# Patient Record
Sex: MALE | Race: WHITE | NOT HISPANIC OR LATINO | Employment: FULL TIME | URBAN - METROPOLITAN AREA
[De-identification: names, ages, dates, MRNs, and addresses within clinical notes are randomized per-mention and may not be internally consistent; named-entity substitution may affect disease eponyms.]

---

## 2017-05-10 ENCOUNTER — HOSPITAL ENCOUNTER (EMERGENCY)
Facility: HOSPITAL | Age: 52
Discharge: HOME | End: 2017-05-10
Attending: EMERGENCY MEDICINE
Payer: COMMERCIAL

## 2017-05-10 VITALS
TEMPERATURE: 99 F | SYSTOLIC BLOOD PRESSURE: 139 MMHG | BODY MASS INDEX: 31.02 KG/M2 | DIASTOLIC BLOOD PRESSURE: 72 MMHG | RESPIRATION RATE: 21 BRPM | OXYGEN SATURATION: 100 % | HEART RATE: 80 BPM | WEIGHT: 229 LBS | HEIGHT: 72 IN

## 2017-05-10 DIAGNOSIS — R56.9 PARTIAL SEIZURE: Primary | ICD-10-CM

## 2017-05-10 DIAGNOSIS — R53.1 WEAKNESS: ICD-10-CM

## 2017-05-10 LAB
ALBUMIN SERPL BCP-MCNC: 4.1 G/DL
ALP SERPL-CCNC: 72 U/L
ALT SERPL W/O P-5'-P-CCNC: 53 U/L
ANION GAP SERPL CALC-SCNC: 8 MMOL/L
AST SERPL-CCNC: 33 U/L
BASOPHILS # BLD AUTO: 0.02 K/UL
BASOPHILS NFR BLD: 0.3 %
BILIRUB SERPL-MCNC: 0.5 MG/DL
BUN SERPL-MCNC: 21 MG/DL
CALCIUM SERPL-MCNC: 9.4 MG/DL
CHLORIDE SERPL-SCNC: 105 MMOL/L
CHOLEST/HDLC SERPL: 7.7 {RATIO}
CK SERPL-CCNC: 152 U/L
CO2 SERPL-SCNC: 27 MMOL/L
CREAT SERPL-MCNC: 1.1 MG/DL
DIFFERENTIAL METHOD: ABNORMAL
EOSINOPHIL # BLD AUTO: 0.2 K/UL
EOSINOPHIL NFR BLD: 3.2 %
ERYTHROCYTE [DISTWIDTH] IN BLOOD BY AUTOMATED COUNT: 12.8 %
EST. GFR  (AFRICAN AMERICAN): >60 ML/MIN/1.73 M^2
EST. GFR  (NON AFRICAN AMERICAN): >60 ML/MIN/1.73 M^2
GLUCOSE SERPL-MCNC: 101 MG/DL
HCT VFR BLD AUTO: 42.9 %
HDL/CHOLESTEROL RATIO: 13.1 %
HDLC SERPL-MCNC: 222 MG/DL
HDLC SERPL-MCNC: 29 MG/DL
HGB BLD-MCNC: 14.8 G/DL
INR PPP: 0.9
LDLC SERPL CALC-MCNC: ABNORMAL MG/DL
LYMPHOCYTES # BLD AUTO: 3.1 K/UL
LYMPHOCYTES NFR BLD: 41.7 %
MCH RBC QN AUTO: 30.4 PG
MCHC RBC AUTO-ENTMCNC: 34.5 %
MCV RBC AUTO: 88 FL
MONOCYTES # BLD AUTO: 0.5 K/UL
MONOCYTES NFR BLD: 7.3 %
NEUTROPHILS # BLD AUTO: 3.5 K/UL
NEUTROPHILS NFR BLD: 47.1 %
NONHDLC SERPL-MCNC: 193 MG/DL
PLATELET # BLD AUTO: 205 K/UL
PMV BLD AUTO: 8.7 FL
POCT GLUCOSE: 115 MG/DL (ref 70–110)
POTASSIUM SERPL-SCNC: 4.2 MMOL/L
PROT SERPL-MCNC: 7.5 G/DL
PROTHROMBIN TIME: 10 SEC
RBC # BLD AUTO: 4.87 M/UL
SODIUM SERPL-SCNC: 140 MMOL/L
TRIGL SERPL-MCNC: 629 MG/DL
TROPONIN I SERPL DL<=0.01 NG/ML-MCNC: 0.01 NG/ML
TSH SERPL DL<=0.005 MIU/L-ACNC: 1.12 UIU/ML
WBC # BLD AUTO: 7.39 K/UL

## 2017-05-10 PROCEDURE — 96375 TX/PRO/DX INJ NEW DRUG ADDON: CPT

## 2017-05-10 PROCEDURE — A9585 GADOBUTROL INJECTION: HCPCS | Performed by: EMERGENCY MEDICINE

## 2017-05-10 PROCEDURE — 85610 PROTHROMBIN TIME: CPT

## 2017-05-10 PROCEDURE — 80061 LIPID PANEL: CPT

## 2017-05-10 PROCEDURE — 99243 OFF/OP CNSLTJ NEW/EST LOW 30: CPT | Mod: GT,,, | Performed by: PSYCHIATRY & NEUROLOGY

## 2017-05-10 PROCEDURE — 84443 ASSAY THYROID STIM HORMONE: CPT

## 2017-05-10 PROCEDURE — 80053 COMPREHEN METABOLIC PANEL: CPT

## 2017-05-10 PROCEDURE — 63600175 PHARM REV CODE 636 W HCPCS: Performed by: EMERGENCY MEDICINE

## 2017-05-10 PROCEDURE — 96374 THER/PROPH/DIAG INJ IV PUSH: CPT

## 2017-05-10 PROCEDURE — 84484 ASSAY OF TROPONIN QUANT: CPT

## 2017-05-10 PROCEDURE — 25000003 PHARM REV CODE 250: Performed by: EMERGENCY MEDICINE

## 2017-05-10 PROCEDURE — 93005 ELECTROCARDIOGRAM TRACING: CPT

## 2017-05-10 PROCEDURE — 25500020 PHARM REV CODE 255: Performed by: EMERGENCY MEDICINE

## 2017-05-10 PROCEDURE — 82962 GLUCOSE BLOOD TEST: CPT

## 2017-05-10 PROCEDURE — 82550 ASSAY OF CK (CPK): CPT

## 2017-05-10 PROCEDURE — 85025 COMPLETE CBC W/AUTO DIFF WBC: CPT

## 2017-05-10 PROCEDURE — 99285 EMERGENCY DEPT VISIT HI MDM: CPT | Mod: 25

## 2017-05-10 RX ORDER — LORAZEPAM 2 MG/ML
1 INJECTION INTRAMUSCULAR
Status: COMPLETED | OUTPATIENT
Start: 2017-05-10 | End: 2017-05-10

## 2017-05-10 RX ORDER — LEVETIRACETAM 1000 MG/1
1000 TABLET ORAL 2 TIMES DAILY
Qty: 60 TABLET | Refills: 10 | Status: SHIPPED | OUTPATIENT
Start: 2017-05-10 | End: 2018-05-10

## 2017-05-10 RX ORDER — LISINOPRIL 20 MG/1
20 TABLET ORAL DAILY
COMMUNITY

## 2017-05-10 RX ORDER — GADOBUTROL 604.72 MG/ML
10 INJECTION INTRAVENOUS
Status: COMPLETED | OUTPATIENT
Start: 2017-05-10 | End: 2017-05-10

## 2017-05-10 RX ORDER — NAPROXEN SODIUM 220 MG/1
81 TABLET, FILM COATED ORAL DAILY
COMMUNITY

## 2017-05-10 RX ADMIN — GADOBUTROL 10 ML: 604.72 INJECTION INTRAVENOUS at 01:05

## 2017-05-10 RX ADMIN — LORAZEPAM 1 MG: 2 INJECTION, SOLUTION INTRAMUSCULAR; INTRAVENOUS at 11:05

## 2017-05-10 RX ADMIN — DEXTROSE 3000 MG: 50 INJECTION, SOLUTION INTRAVENOUS at 02:05

## 2017-05-10 RX ADMIN — SODIUM CHLORIDE 1000 ML: 0.9 INJECTION, SOLUTION INTRAVENOUS at 10:05

## 2017-05-10 NOTE — DISCHARGE INSTRUCTIONS
"  Seizure: New Onset, Unknown Cause (Adult)  You have had a seizure today. A seizure happens when a burst of random, uncontrolled electrical activity occurs in the brain. A seizure can have many causes. Often its not possible to figure out the exact cause of a seizure from a single exam. You might need other tests. Having a single seizure doesnt mean that you will continue to have seizures or that you have epilepsy. But until doctors know the cause of your seizure, you should assume that another seizure is possible.  Home care  Follow these tips when caring for yourself at home. For this seizure:  · Seizures arent predictable. So avoid doing anything that might cause danger to you or other people if you have another seizure. Dont drive, ride a bike, or operate dangerous equipment.  · Dont take a bath alone. Take a shower instead.  · Dont swim alone until your healthcare provider says that you are no longer in danger of having another seizure.  · Tell your close friends and relatives about your seizure. Teach them what to do for you if it happens again.  · If medicine was prescribed to prevent seizures, take it exactly as directed. It does not work when taken "as needed." Missing doses will increase the risk of having another seizure.  · Follow a regular sleep schedule such that you get at least 6 to 8 hours of restful sleep every night. This is especially important when you are sick and have a cold, flu, or another type of infection.  · Don't drink alcoholic beverages until your doctor says it's OK. Do not ever use recreational drugs.  For future seizures, if you are alone:  If you feel a seizure coming on, lie down on a bed or on the floor with something soft under your head. Lie on your left side, not on your back. This will keep you from falling. It will also let fluid drain out of your mouth and prevent choking. Be sure you are clear of any objects that might injure you during the seizure. Call 911 if " there is time.  For future seizures, if someone is with you:  The person should help you get into a safe position and call 911. The person shouldnt try to force anything in your mouth once the seizure begins. This could harm your teeth or jaw.  After a seizure, you may be drowsy or confused. The person should stay with you until you are fully awake. The person shouldnt offer you anything to eat or drink during that time. Call 911 or go to the emergency department so that you can be looked at.  Follow-up care  Follow up with your healthcare provider, or as advised. You may need other tests to help figure out what caused your seizure. These tests may include brain wave tests (EEG) or brain scans (MRI or CT scans). Keep a seizure calendar to record how often you have a seizure. If you are being started on anti-seizure medicine, make sure that you use additional birth control protection. Seizure medicine can affect how well birth control pills work, and you could become pregnant. Don't drink alcohol until your doctor tells you its OK. Do not ever use recreational drugs.  Note: For the safety of yourself and others on the road, certain states require that the treating doctor tell the Public Health Department about any adult who is treated for a seizure and is at risk of more seizures. In this case, the Department of Motor Vehicles will be told. A restriction will be put on your s license until a doctor gives you medical clearance to drive again. Contact your treating doctor to find out if your state requires the reporting of patients with a seizures condition.  When to seek medical advice  Call your healthcare provider right away if any of these occur:  · Another seizure  · Fever over 100.4ºF (38.0ºC), or as advised  · Unusual irritability, drowsiness, or confusion  · Headache or neck pain that gets worse  Date Last Reviewed: 8/1/2016  © 1566-9853 [employer]. [address] [place] [address], [place]  [address]. All rights reserved. This information is not intended as a substitute for professional medical care. Always follow your healthcare professional's instructions.

## 2017-05-10 NOTE — CONSULTS
"   [facility]  Tele-Consult    Consultation started: 5/10/2017 at 9:14AM   Consulting Provider:  Dr. Abel  The patient location is [facility]  Spoke hospital nurse at bedside with patient assisting consultant.     Subjective:   Subjective   History of Present Illness:  Jamal Malagon is a 52 y.o. male who presents with acute onset of lightheadedness associated with intense thirst, right sided paresthesias, generalized weakness and near syncope. Per co-worker, patient was slow to answer questions and seemed confused about what he was doing prior to onset of symptoms. He was able to walk to a private vehicle with help. He had no focal weakness per witness but the tremor in the right arm was persistent..Onset ~0830. Seemed tp improve after drinking a glass of "cold water". Had similar event 2 years ago and had stroke work-up at that time with MRI and MRA which was negative. Discharged with diagnosis of dehydration.    Wake up Stroke?: no  Last known normal:    Recent bleeding noted: no  Does the patient take any Blood Thinners? no           H&P was obtained from patient and co-worker and ED physician..  Past Medical History: hypertension    Medications: No current facility-administered medications for this encounter.     Current Outpatient Prescriptions:     aspirin 81 MG Chew, Take 81 mg by mouth once daily., Disp: , Rfl:     lisinopril (PRINIVIL,ZESTRIL) 20 MG tablet, Take 20 mg by mouth once daily., Disp: , Rfl:     Allergies: Review of patient's allergies indicates:  No Known Allergies    Objective:     Vitals:   Vitals:    05/10/17 0905   BP: (!) 178/103   Pulse: 80   Resp: 18   Temp: 98.7 °F (37.1 °C)        Objective   Physical Exam:  General: well developed, well nourished   HENT: Head:normocephalic and atraumatic   HENT: Ears: right ear normal and left ear normal  Nose: normal nares and no discharge  Eyes:conjunctivae/corneas clear. PERRL.  Neck: normal, no " obvious masses and trachea to midline  Lungs: normal respiratory effort  Cardiovascular: Heart: regular rate and rhythm   Cardiovascular: Extremities: no cyanosis, no edema and no clubbing  Abdomen: appears normal and not distended  Skin:  skin color and texture normal, no rash and no bruises  Musculoskeletal: normal range of motion in all extremities  Psych/Behavioral: appropriate affect       Imaging Notes: No hemmorhage. No mass effect. No early infarct signs. Impression performed at: 0925    NIH Stroke Scale:  Interval: baseline (upon arrival/admit)  Level of Consciousness: 0 - alert  LOC Questions: 0 - answers both correctly  LOC Commands: 0 - performs both correctly  Best Gaze: 0 - normal  Visual: 0 - no visual loss  Facial Palsy: 0 - normal  Motor Left Arm: 0 - no drift  Motor Right Arm: 0 - no drift  Motor Left Le - no drift  Motor Right Le - no drift  Limb Ataxia: 0 - absent  Sensory: 0 - normal  Best Language: 0 - no aphasia  Dysarthria: 0 - normal articulation  Extinction and Inattention: 0 - no neglect  NIH Stroke Scale Total: 0  Ramakrishna Coma Scale:  Best Eye Response: 4 - spontaneous  Best Motor Response: 6 - obeys commands  Best Verbal Response: 5 - oriented  Ramakrishna Coma Scale Total: 15  Modified Raysal Scale:   Timeline: Prior to symptoms onset  Modified Anne Score: 0 - no symptoms    ABCD2 Scale for TIA:   Age > or = 60: 0 - no  B/P or = 140/9 at Initial Evaluation: 1 - yes  Clinical Features of TIA: 0 - other symptoms  Duration of Symptoms: 2 - > or equal to 60 minutes  Diabetes Mellitus in History: 0 - no  ABCD2 Scale Total: 3  [id number] Scale:   Age: 0 - < 65 years old  CHF History: 0 - no  HTN History: 1 - yes  Stroke/TIA/Thromboembolism History: 0 - no  Vascular Disease History: 0 - no  Diabetes Mellitus in History: 0 - no  Female: 0 - no  [id number] Scale Total: 1        Assessment - Diagnosis - Goals:     Plan     Diagnosis/Impression: Dehydration vs vasovagal event. Consider  seizure given RUE tremors.    Altaplase Recommendation: Altaplase not recommended due to No evidence of stroke    Recommendation: NPO until after pass dysphagia screen. Diagnostic studies: Lipid Profile to assess cholesterol levels, MRI head without contrast to assess brain parenchyma, Trans-thoracic cardiac echo to assess cardiac function/status  Antithrombotics: Aspirin: 325 mg oral daily  Statins: Atorvastatin- 40 mg oral daily  B/P Parameters; -160, DBP 70-80.  Hydration and EEG    Disposition Recommendation:  admit to inpatient  do not transfer       Possible Interventional Revascularization Candidate? No; No significant neurological deficit    Recommended the emergency room physician to have a brief discussion with the patient and/or family if available regarding the risks and benefits of treatment, and to briefly document the occurrence of that discussion in his clinical encounter note.     The attending portion of this evaluation, treatment, and documentation was performed per Samra Huynh via audiovisual.      Billing code:  (non-stroke, some mimics)    · This patient has neurological symptom(s)/condition/illness, with minimal potential for morbidity and mortality.  · There is a low probability for acute neurological change leading to clinical and possibly life-threatening deterioration requiring highest level of physician preparedness for urgent intervention.  · Care was coordinated with other physicians involved in the patient's care.  · Radiologic studies and laboratory data were reviewed and interpreted, and plan of care was re-assessed based on the results.  Diagnosis, treatment options and prognosis may have been discussed with the patient and/or family members or caregiver.      Consultation ended: 5/10/2017 at 0935

## 2017-05-10 NOTE — ED NOTES
Spoke with radha in pharmacy about the keppra dose and was told it was ok at 3,000mg. Medication dose verified with Dr. Benton

## 2017-05-10 NOTE — CONSULTS
"NEUROLOGY FLOOR CONSULT    Reason for consult:  Jeromy    Informant:  Patient       Other sources of information : spouse/SO        HPI:   Jamal Malagon is a 52 y.o. male who presents with acute onset of lightheadedness associated with intense thirst, right sided paresthesias, generalized weakness and near syncope. Per co-worker, patient was slow to answer questions and seemed confused about what he was doing prior to onset of symptoms. He was able to walk to a private vehicle with help. He had no focal weakness per witness but the tremor in the right arm was persistent..Onset ~0830. Seemed tp improve after drinking a glass of "cold water". Had similar event 2 years ago and had stroke work-up at that time with MRI and MRA which was negative. Discharged with diagnosis of dehydration.   Spoke with ER physician another episode was witnessed in ER involving the jerking of RUE and RLE and patient is awake during this epiosde , described simple focal seizures    ROS: denies any numbness, tingling, dysarthria, Headaches    Histories:     Allergies:  Review of patient's allergies indicates no known allergies.    Current Medications:    No current facility-administered medications for this encounter.      Current Outpatient Prescriptions   Medication Sig Dispense Refill    aspirin 81 MG Chew Take 81 mg by mouth once daily.      lisinopril (PRINIVIL,ZESTRIL) 20 MG tablet Take 20 mg by mouth once daily.         Past Medical/Surgical/Family History:  Medical:   Past Medical History:   Diagnosis Date    Community acquired pneumonia 8/12/2012    Kidney calculi     Kidney stones      Surgeries:   Past Surgical History:   Procedure Laterality Date    APPENDECTOMY  childhood    CHOLECYSTECTOMY  2005      Family:   Family History   Problem Relation Age of Onset    Coronary artery disease Father 62     CABG    Diabetes type II Mother     Diabetes type II Sister     Cirrhosis Sister     Transient ischemic attack Sister  "   , no family history of nerve or muscle disease          Current Evaluation:     Vital Signs:   Vitals:    05/10/17 1050   BP: (!) 147/81   Pulse: 83   Resp: 16   Temp:           ORIENTATION: awake, alert oriented x3      MEMORY: recent and remote memory intact    LANGUAGE: 0=No aphasia, normal    CRANIAL NERVES:  II: pupils equal, round, reactive to light and accommodation, III,IV,VI: extraocular muscles extra-ocular motions intact, V: facial light touch sensation normal bilaterally, VII: upper facial muscle function normal bilaterally, VII: lower facial muscle function normal bilaterally, IX: soft palate elevation normal in midline, XI: trapezius strength normal bilaterally, XI: sternocleidomastoid strength normal bilaterally, XII: tongue strength normal     MOTOR:  Pronator drift: absent   RUE; 5/5  RLE:  5/5  LUE:  5/5  LLE:  5/5    Tone: normal    DTR'S:  symmetric 2+    SENSORY:  normal to light touch, a pin prick and proprioception bilaterally    CEREBELLAR/GAIT:  Finger to nose:normal  Heel to shin:normal  Gait: normal,   LABORATORY STUDIES:  Recent Results (from the past 24 hour(s))   POCT glucose    Collection Time: 05/10/17  9:10 AM   Result Value Ref Range    POCT Glucose 115 (H) 70 - 110 mg/dL   CBC W/ AUTO DIFFERENTIAL    Collection Time: 05/10/17  9:18 AM   Result Value Ref Range    WBC 7.39 3.90 - 12.70 K/uL    RBC 4.87 4.60 - 6.20 M/uL    Hemoglobin 14.8 14.0 - 18.0 g/dL    Hematocrit 42.9 40.0 - 54.0 %    MCV 88 82 - 98 fL    MCH 30.4 27.0 - 31.0 pg    MCHC 34.5 32.0 - 36.0 %    RDW 12.8 11.5 - 14.5 %    Platelets 205 150 - 350 K/uL    MPV 8.7 (L) 9.2 - 12.9 fL    Gran # 3.5 1.8 - 7.7 K/uL    Lymph # 3.1 1.0 - 4.8 K/uL    Mono # 0.5 0.3 - 1.0 K/uL    Eos # 0.2 0.0 - 0.5 K/uL    Baso # 0.02 0.00 - 0.20 K/uL    Gran% 47.1 38.0 - 73.0 %    Lymph% 41.7 18.0 - 48.0 %    Mono% 7.3 4.0 - 15.0 %    Eosinophil% 3.2 0.0 - 8.0 %    Basophil% 0.3 0.0 - 1.9 %    Differential Method Automated    Comprehensive  metabolic panel    Collection Time: 05/10/17  9:18 AM   Result Value Ref Range    Sodium 140 136 - 145 mmol/L    Potassium 4.2 3.5 - 5.1 mmol/L    Chloride 105 95 - 110 mmol/L    CO2 27 23 - 29 mmol/L    Glucose 101 70 - 110 mg/dL    BUN, Bld 21 (H) 6 - 20 mg/dL    Creatinine 1.1 0.5 - 1.4 mg/dL    Calcium 9.4 8.7 - 10.5 mg/dL    Total Protein 7.5 6.0 - 8.4 g/dL    Albumin 4.1 3.5 - 5.2 g/dL    Total Bilirubin 0.5 0.1 - 1.0 mg/dL    Alkaline Phosphatase 72 55 - 135 U/L    AST 33 10 - 40 U/L    ALT 53 (H) 10 - 44 U/L    Anion Gap 8 8 - 16 mmol/L    eGFR if African American >60 >60 mL/min/1.73 m^2    eGFR if non African American >60 >60 mL/min/1.73 m^2   Protime-INR    Collection Time: 05/10/17  9:18 AM   Result Value Ref Range    Prothrombin Time 10.0 9.0 - 12.5 sec    INR 0.9 0.8 - 1.2   TSH    Collection Time: 05/10/17  9:18 AM   Result Value Ref Range    TSH 1.125 0.400 - 4.000 uIU/mL   LDL - Lipid Panel    Collection Time: 05/10/17  9:18 AM   Result Value Ref Range    Cholesterol 222 (H) 120 - 199 mg/dL    Triglycerides 629 (H) 30 - 150 mg/dL    HDL 29 (L) 40 - 75 mg/dL    LDL Cholesterol Invalid, Trig>400.0 63.0 - 159.0 mg/dL    HDL/Chol Ratio 13.1 (L) 20.0 - 50.0 %    Total Cholesterol/HDL Ratio 7.7 (H) 2.0 - 5.0    Non-HDL Cholesterol 193 mg/dL   Troponin I    Collection Time: 05/10/17  9:18 AM   Result Value Ref Range    Troponin I 0.014 0.000 - 0.026 ng/mL   CK    Collection Time: 05/10/17  9:18 AM   Result Value Ref Range     20 - 200 U/L         BRAIN MRI normal results      Assessment:  P     51 y/o male with syncopal episode with transient right sided weakness that resolved [place] [facility] NIHSS:0 associated with 2 witnessed focal seizure episodes.  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  Recommend  EEG.  Recommend loading with Keppra 3gm followed by 1000 mg q12.    Patient was instructed about the following seizure precautions:     - Take all medications as prescribed by your doctor. Specifically take your anti-epileptic drugs at timed intervals that are on the prescription label.  - Do not drive or operate heavy machinery for a state-law specific period of time from seizure activity  - If you ride a bicycle or any other manually propelled device, please use a helmet  - Never swim alone or without close supervision  - Use showers only; do not take a bath or put yourself in a situation where loss of responsiveness could lead to drowning  - Only cook under supervision. Use the back burners only.  - Do not hold a child or carry an infant. If breastfeeding, only perform this in a seated position with cushioning.   - Do not engage in any activity that in the event of a seizure or loss of responsiveness could lead to any type of bodily injury to yourself or others    Case discussed with Dr.Barton Zavala f/u EEG results      Appreciate the consult.     signature: Sena Barksdale MD     Neurology PGY 3

## 2017-05-10 NOTE — ED NOTES
APPEARANCE: Alert, oriented and in no acute distress.  CARDIAC: Normal rate and rhythm, no murmur heard.   PERIPHERAL VASCULAR: peripheral pulses present. Normal cap refill. No edema. Warm to touch.    RESPIRATORY:Normal rate and effort, breath sounds clear bilaterally throughout chest. Respirations are equal and unlabored no obvious signs of distress.  GASTRO: soft, bowel sounds normal, no tenderness, no abdominal distention.  MUSC: Full ROM. No bony tenderness or soft tissue tenderness. No obvious deformity.  SKIN: Skin is warm and dry, normal skin turgor, mucous membranes moist.  MENTAL STATUS: awake, alert and aware of environment.  EYE: PERRL, both eyes: pupils brisk and reactive to light. Normal size.  ENT: EARS: no obvious drainage. NOSE: no active bleeding.

## 2017-05-10 NOTE — ED AVS SNAPSHOT
[facility]  [address]  [place] [address]               Jamal Malagon   5/10/2017  8:58 AM   ED    Description:  Male : 1965   Department:  [facility]           Your Care was Coordinated By:     Provider Role From To    Chemo Benton MD Attending Provider 05/10/17 0902 --      Reason for Visit     Extremity Weakness           Diagnoses this Visit        Comments    Partial seizure    -  Primary     Weakness           ED Disposition     None           To Do List           Follow-up Information     Schedule an appointment as soon as possible for a visit with Berta Jay MD.    Specialty:  Neurology    Contact information:    [address]  [address]  [place] [address]  [phone]         These Medications        Disp Refills Start End    levetiracetam (KEPPRA) 1000 MG tablet 60 tablet 10 5/10/2017 5/10/2018    Take 1 tablet (1,000 mg total) by mouth 2 (two) times daily. - Oral      [facility]     [facility] Care Line -  Assistance  Unless otherwise directed by your provider, please contact [facility], our nurse care line that is available for  assistance.     Registered nurses in the [facility] provide: appointment scheduling, clinical advisement, health education, and other advisory services.  Call: [phone] (toll free)               Medications           Message regarding Medications     Verify the changes and/or additions to your medication regime listed below are the same as discussed with your clinician today.  If any of these changes or additions are incorrect, please notify your healthcare provider.        START taking these NEW medications        Refills    levetiracetam (KEPPRA) 1000 MG tablet 10    Sig: Take 1 tablet (1,000 mg total) by mouth 2 (two) times daily.    Class: Print    Route: Oral      These medications were administered today        Dose Freq    sodium  chloride 0.9% bolus 1,000 mL 1,000 mL ED 1 Time    Sig: Inject 1,000 mLs into the vein ED 1 Time.    Class: Normal    Route: Intravenous    lorazepam injection 1 mg 1 mg ED 1 Time    Sig: Inject 0.5 mLs (1 mg total) into the vein ED 1 Time.    Class: Normal    Route: Intravenous    levetiracetam (KEPPRA) 3,000 mg in dextrose 5 % 100 mL IVPB 3,000 mg ED 1 Time    Sig: Inject 3,000 mg into the vein ED 1 Time.    Class: Normal    Route: Intravenous    gadobutrol 10 mL 10 mL IMG once as needed    Sig: Inject 10 mLs into the vein ONCE PRN for contrast.    Class: Normal    Route: Intravenous    Non-formulary Exception Code: Defer to pharmacy           Verify that the below list of medications is an accurate representation of the medications you are currently taking.  If none reported, the list may be blank. If incorrect, please contact your healthcare provider. Carry this list with you in case of emergency.           Current Medications     aspirin 81 MG Chew Take 81 mg by mouth once daily.    lisinopril (PRINIVIL,ZESTRIL) 20 MG tablet Take 20 mg by mouth once daily.    levetiracetam (KEPPRA) 1000 MG tablet Take 1 tablet (1,000 mg total) by mouth 2 (two) times daily.           Clinical Reference Information           Your Vitals Were     BP Pulse Temp Resp Height Weight    147/81 83 98.7 °F (37.1 °C) (Oral) 16 6' (1.829 m) 103.9 kg (229 lb)    SpO2 BMI             96% 31.06 kg/m2         Allergies as of 5/10/2017     No Known Allergies      Immunizations Administered on Date of Encounter - 5/10/2017     None      ED Micro, Lab, POCT     Start Ordered       Status Ordering Provider    05/10/17 0942 05/10/17 0941  CPK  Add-on      Completed     05/10/17 0942 05/10/17 0941  Troponin I  Add-on      Completed     05/10/17 0910 05/10/17 0910  POCT glucose  Once      Final result     05/10/17 0907 05/10/17 0906  CBC W/ AUTO DIFFERENTIAL  Once      Final result     05/10/17 0907 05/10/17 0906  Comprehensive metabolic panel  Once       Final result     05/10/17 0907 05/10/17 0906  Protime-INR  Once      Final result     05/10/17 0907 05/10/17 0906  TSH  Once      Final result     05/10/17 0907 05/10/17 0906  POCT glucose  Once      Acknowledged     05/10/17 0907 05/10/17 0906  LDL - Lipid Panel  STAT      Final result     05/10/17 0906 05/10/17 0906  Troponin I  Once      Final result     05/10/17 0906 05/10/17 0906  CK  Once      Final result       ED Imaging Orders     Start Ordered       Status Ordering Provider    05/10/17 1156 05/10/17 1155  MRA Neck with contrast  1 time imaging      Final result     05/10/17 1155 05/10/17 1154  MRI Brain W WO Contrast  1 time imaging      Final result     05/10/17 1155 05/10/17 1155  MRA Brain without contrast  1 time imaging      Final result     05/10/17 1105 05/10/17 1104  MRI Brain W WO Contrast  1 time imaging      Acknowledged     05/10/17 1105 05/10/17 1105  MRA Brain without contrast  1 time imaging      Acknowledged     05/10/17 1105 05/10/17 1105  MRA Neck with contrast  1 time imaging      Acknowledged     05/10/17 0907 05/10/17 0906  X-Ray Chest AP Portable  1 time imaging      Final result     05/10/17 0904 05/10/17 0903  CT Head Without Contrast  1 time imaging      Final result         Discharge Instructions         Seizure: New Onset, Unknown Cause (Adult)  You have had a seizure today. A seizure happens when a burst of random, uncontrolled electrical activity occurs in the brain. A seizure can have many causes. Often its not possible to figure out the exact cause of a seizure from a single exam. You might need other tests. Having a single seizure doesnt mean that you will continue to have seizures or that you have epilepsy. But until doctors know the cause of your seizure, you should assume that another seizure is possible.  Home care  Follow these tips when caring for yourself at home. For this seizure:  · Seizures arent predictable. So avoid doing anything that might cause danger  "to you or other people if you have another seizure. Dont drive, ride a bike, or operate dangerous equipment.  · Dont take a bath alone. Take a shower instead.  · Dont swim alone until your healthcare provider says that you are no longer in danger of having another seizure.  · Tell your close friends and relatives about your seizure. Teach them what to do for you if it happens again.  · If medicine was prescribed to prevent seizures, take it exactly as directed. It does not work when taken "as needed." Missing doses will increase the risk of having another seizure.  · Follow a regular sleep schedule such that you get at least 6 to 8 hours of restful sleep every night. This is especially important when you are sick and have a cold, flu, or another type of infection.  · Don't drink alcoholic beverages until your doctor says it's OK. Do not ever use recreational drugs.  For future seizures, if you are alone:  If you feel a seizure coming on, lie down on a bed or on the floor with something soft under your head. Lie on your left side, not on your back. This will keep you from falling. It will also let fluid drain out of your mouth and prevent choking. Be sure you are clear of any objects that might injure you during the seizure. Call 911 if there is time.  For future seizures, if someone is with you:  The person should help you get into a safe position and call 911. The person shouldnt try to force anything in your mouth once the seizure begins. This could harm your teeth or jaw.  After a seizure, you may be drowsy or confused. The person should stay with you until you are fully awake. The person shouldnt offer you anything to eat or drink during that time. Call 911 or go to the emergency department so that you can be looked at.  Follow-up care  Follow up with your healthcare provider, or as advised. You may need other tests to help figure out what caused your seizure. These tests may include brain wave tests (EEG) " or brain scans (MRI or CT scans). Keep a seizure calendar to record how often you have a seizure. If you are being started on anti-seizure medicine, make sure that you use additional birth control protection. Seizure medicine can affect how well birth control pills work, and you could become pregnant. Don't drink alcohol until your doctor tells you its OK. Do not ever use recreational drugs.  Note: For the safety of yourself and others on the road, certain states require that the treating doctor tell the Public Health Department about any adult who is treated for a seizure and is at risk of more seizures. In this case, the Department of Motor Vehicles will be told. A restriction will be put on your s license until a doctor gives you medical clearance to drive again. Contact your treating doctor to find out if your state requires the reporting of patients with a seizures condition.  When to seek medical advice  Call your healthcare provider right away if any of these occur:  · Another seizure  · Fever over 100.4ºF (38.0ºC), or as advised  · Unusual irritability, drowsiness, or confusion  · Headache or neck pain that gets worse  Date Last Reviewed: 8/1/2016  © 9234-5863 [employer]. [address] [place] [address]. All rights reserved. This information is not intended as a substitute for professional medical care. Always follow your healthcare professional's instructions.          Smoking Cessation     If you would like to quit smoking:   You may be eligible for free services if you are a [place] resident and started smoking cigarettes before September 1, 1988.  Call the Smoking Cessation Trust ([facility]) toll free at (830) 617-4447 or (763) 996-2420.   Call 1-800-QUIT-NOW if you do not meet the above criteria.   Contact us via email: [email or web address]   View our website for more information: [email or web address]         [facility] complies with  applicable Federal civil rights laws and does not discriminate on the basis of race, color, national origin, age, disability, or sex.        Language Assistance Services     ATTENTION: Language assistance services are available, free of charge. Please call [phone].      ATENCIÓN: Si habla marcia, tiene a chong disposición servicios gratuitos de asistencia lingüística. Llame al [phone].     CHÚ Ý: N?u b?n nói Ti?ng Vi?t, có các d?ch v? h? tr? ngôn ng? mi?n phí dành cho b?n. G?i s? [phone].

## 2017-05-10 NOTE — ED NOTES
52 year old male presents to ed with chief complaint of right sided extremity weakness/ dizziness/ confusion/ slurred speech that began 30 minutes prior to patient arrival. Patient awake alert ox4 in no respiratory distress answering nurses questions appropriately. Patient able to identify name and . Patients co worker acting as historian states he found patient to be confused complaining of dizziness and having right sided shaking/ extremity weakness.

## 2017-05-10 NOTE — ED PROVIDER NOTES
Encounter Date: 5/10/2017       History     Chief Complaint   Patient presents with    Extremity Weakness     right sided; began 30 minutes pta; accompanied by dizziness, confusion, slurred speech     Review of patient's allergies indicates:  No Known Allergies  HPI Comments: Patient is a 52-year-old male who, while at work this morning, had a sudden onset of lightheadedness, weakness and confusion.  Patient's coworker said his right side seemed to tense up and he had difficulty speaking.  This lasted for a couple of minutes then began to resolve.  Patient says he experienced an extreme dry mouth during this episode.  There was no syncope.  He had no chest pain or shortness of breath.  He had a similar episode such as this occur approximately 2 years ago.  He was admitted with an extensive workup done at that time which was unremarkable.    The history is provided by the patient (and co-worker).     Past Medical History:   Diagnosis Date    Community acquired pneumonia 8/12/2012    Kidney calculi     Kidney stones     Past Surgical History:   Procedure Laterality Date    APPENDECTOMY  childhood    CHOLECYSTECTOMY  2005     Family History   Problem Relation Age of Onset    Coronary artery disease Father 62     CABG    Diabetes type II Mother     Diabetes type II Sister     Cirrhosis Sister     Transient ischemic attack Sister      Social History   Substance Use Topics    Smoking status: Current Every Day Smoker     Packs/day: 1.50     Years: 31.00     Types: Cigarettes    Smokeless tobacco: None    Alcohol use No     Review of Systems   Constitutional: Negative for chills and fever.   Respiratory: Negative for shortness of breath.    Cardiovascular: Negative for chest pain.   Gastrointestinal: Negative for abdominal pain, nausea and vomiting.   Neurological: Positive for weakness and light-headedness. Negative for syncope.   All other systems reviewed and are negative.      Physical Exam   Initial Vitals    BP Pulse Resp Temp SpO2   05/10/17 0905 05/10/17 0905 05/10/17 0905 05/10/17 0905 05/10/17 0905   178/103 80 18 98.7 °F (37.1 °C) 97 %     Physical Exam    Nursing note and vitals reviewed.  Constitutional: He appears well-developed and well-nourished.   HENT:   Head: Normocephalic and atraumatic.   Eyes: EOM are normal. Pupils are equal, round, and reactive to light.   Neck: Normal range of motion. Neck supple.   Cardiovascular: Normal rate and regular rhythm.   Pulmonary/Chest: Breath sounds normal.   Abdominal: Soft. There is no rebound and no guarding.   Musculoskeletal: Normal range of motion.   Neurological: He is alert and oriented to person, place, and time. He has normal strength. No cranial nerve deficit.   Skin: Skin is warm and dry.   Psychiatric: He has a normal mood and affect.         ED Course   Procedures  Labs Reviewed   CBC W/ AUTO DIFFERENTIAL - Abnormal; Notable for the following:        Result Value    MPV 8.7 (*)     All other components within normal limits   COMPREHENSIVE METABOLIC PANEL - Abnormal; Notable for the following:     BUN, Bld 21 (*)     ALT 53 (*)     All other components within normal limits   LIPID PANEL - Abnormal; Notable for the following:     Cholesterol 222 (*)     Triglycerides 629 (*)     HDL 29 (*)     HDL/Chol Ratio 13.1 (*)     Total Cholesterol/HDL Ratio 7.7 (*)     All other components within normal limits   POCT GLUCOSE - Abnormal; Notable for the following:     POCT Glucose 115 (*)     All other components within normal limits   PROTIME-INR   TSH   CK   TROPONIN I   TROPONIN I   CK   POCT GLUCOSE        Imaging Results         MRA Neck with contrast (Final result) Result time:  05/10/17 14:41:41    Final result by Zoltan Mariee MD (05/10/17 14:41:41)    Impression:     1.  Artifact versus annular stenosis under 50 percent at the origin left common carotid artery origin.    2.  Less than 50 percent stenosis origin left internal carotid  artery.        Electronically signed by: CARLOS HERNANDEZ MD  Date:     05/10/17  Time:    14:41     Narrative:    MRI of neck with gadovist 10 cc intravenous.  Comparison with MRI and CT exams dating back 2015.  Left vertebral artery dominant.  Focal plaque stenosis question origin left common carotid from the aortic arch, evidence of annular stenosis less than 50 percent, could be artifactual, seen better advantage on 2015 exam.    Annular under 50 percent segmental stenosis origin left internal carotid artery.  Internal and external carotid arteries are otherwise widely patent.            MRI Brain W WO Contrast (Final result) Result time:  05/10/17 15:08:47    Final result by Zoltan Hernandez MD (05/10/17 15:08:47)    Impression:     MRI brain stable, and no new stroke or other change from previous exams.  Few remote areas of gliosis frontal lobe stable.        Electronically signed by: CARLOS HERNANDEZ MD  Date:     05/10/17  Time:    15:08     Narrative:    MRI brain without and with gadovist 10 cc intravenous.  Comparison with CT brain scan same day, MRI brain and CT 2015.    The limited fluid within the inferior aspect of mastoid air cells stable.  Few foci of subcortical deep white matter increased signal particularly frontal lobes anteriorly unchanged.  No new hemorrhage, subdural fluid, mass or stroke.  Central cortical atrophy stable.  Postcontrast exam no abnormal intra-axial or extra-axial enhancement or brain.            MRA Brain without contrast (Final result) Result time:  05/10/17 14:48:01    Final result by Zoltan Hernandez MD (05/10/17 14:48:01)    Impression:     MRA brain within the range of normal.  a      Electronically signed by: CARLOS HERNANDEZ MD  Date:     05/10/17  Time:    14:48     Narrative:    MRA brain without contrast.  Left vertebral artery dominant.  The vertebral, carotid, basilar artery and major branches are otherwise patent.  Evidence anterior communicating  artery, and no posterior communicating arteries.  No aneurysm, AVM, significant occlusive disease change.            X-Ray Chest AP Portable (Final result) Result time:  05/10/17 10:38:12    Final result by Gian Graff MD (05/10/17 10:38:12)    Impression:     No acute cardiopulmonary process.      Electronically signed by: GIAN GRAFF MD  Date:     05/10/17  Time:    10:38     Narrative:    Portable AP chest x-ray    Comparison: 03/28/15    Findings:  There is no consolidation, effusion, or pneumothorax.  Cardiomediastinal silhouette is unremarkable.            CT Head Without Contrast (Final result) Result time:  05/10/17 09:16:19    Final result by Gian Graff MD (05/10/17 09:16:19)    Impression:     Unremarkable examination.      Electronically signed by: GIAN GRAFF MD  Date:     05/10/17  Time:    09:16     Narrative:    Head CT    Technique: CT scan of the head was performed without contrast utilizing 5-mm contiguous axial sections.    Comparison: 03/28/15    Findings:   There is no intracranial hemorrhage, fluid collection, or mass effect.  Gray-white differentiation is preserved.  Limited evaluation of paranasal sinuses, mastoid air cells, and calvarium is unremarkable.                 Medical Decision Making:   Clinical Tests:   Lab Tests: Ordered and Reviewed  The following lab test(s) were unremarkable: CBC, CMP and Troponin  Radiological Study: Ordered and Reviewed  ED Management:  52-year-old male who had what appeared to be a partial seizure.  CT of the head and MRI of the head and neck reveal no acute abnormalities.  Patient was seen and evaluated by Dr. Luna, neurology fellow.  He was given 3 g of Keppra intravenously here in the ED.  He'll be discharged home with a prescription for Keppra 1000 mg twice a day.  Patient has been given instructions on seizure precautions, specifically advised not to drive or bathe.  He will follow-up with Dr. Jay for an outpatient EEG.                    ED Course     Clinical Impression:   The primary encounter diagnosis was Partial seizure. A diagnosis of Weakness was also pertinent to this visit.          Chemo Benton MD  05/10/17 1754

## 2024-02-03 ENCOUNTER — HOSPITAL ENCOUNTER (EMERGENCY)
Facility: HOSPITAL | Age: 59
Discharge: HOME | End: 2024-02-03
Attending: EMERGENCY MEDICINE

## 2024-02-03 VITALS
RESPIRATION RATE: 16 BRPM | SYSTOLIC BLOOD PRESSURE: 162 MMHG | OXYGEN SATURATION: 96 % | WEIGHT: 260 LBS | HEART RATE: 100 BPM | HEIGHT: 72 IN | DIASTOLIC BLOOD PRESSURE: 74 MMHG | TEMPERATURE: 99 F | BODY MASS INDEX: 35.21 KG/M2

## 2024-02-03 DIAGNOSIS — N20.0 KIDNEY STONE: Primary | ICD-10-CM

## 2024-02-03 LAB
ALBUMIN SERPL BCP-MCNC: 3.8 G/DL (ref 3.5–5.2)
ALP SERPL-CCNC: 48 U/L (ref 55–135)
ALT SERPL W/O P-5'-P-CCNC: 48 U/L (ref 10–44)
ANION GAP SERPL CALC-SCNC: 11 MMOL/L (ref 8–16)
AST SERPL-CCNC: 45 U/L (ref 10–40)
BACTERIA #/AREA URNS HPF: ABNORMAL /HPF
BASOPHILS # BLD AUTO: 0.03 K/UL (ref 0–0.2)
BASOPHILS NFR BLD: 0.3 % (ref 0–1.9)
BILIRUB SERPL-MCNC: 0.4 MG/DL (ref 0.1–1)
BILIRUB UR QL STRIP: NEGATIVE
BUN SERPL-MCNC: 20 MG/DL (ref 6–20)
CALCIUM SERPL-MCNC: 8.7 MG/DL (ref 8.7–10.5)
CHLORIDE SERPL-SCNC: 107 MMOL/L (ref 95–110)
CLARITY UR: CLEAR
CO2 SERPL-SCNC: 25 MMOL/L (ref 23–29)
COLOR UR: ABNORMAL
CREAT SERPL-MCNC: 1.1 MG/DL (ref 0.5–1.4)
DIFFERENTIAL METHOD BLD: ABNORMAL
EOSINOPHIL # BLD AUTO: 0.3 K/UL (ref 0–0.5)
EOSINOPHIL NFR BLD: 2.9 % (ref 0–8)
ERYTHROCYTE [DISTWIDTH] IN BLOOD BY AUTOMATED COUNT: 13.5 % (ref 11.5–14.5)
EST. GFR  (NO RACE VARIABLE): >60 ML/MIN/1.73 M^2
GLUCOSE SERPL-MCNC: 100 MG/DL (ref 70–110)
GLUCOSE UR QL STRIP: NEGATIVE
HCT VFR BLD AUTO: 39.2 % (ref 40–54)
HGB BLD-MCNC: 13.4 G/DL (ref 14–18)
HGB UR QL STRIP: ABNORMAL
IMM GRANULOCYTES # BLD AUTO: 0.05 K/UL (ref 0–0.04)
IMM GRANULOCYTES NFR BLD AUTO: 0.5 % (ref 0–0.5)
KETONES UR QL STRIP: NEGATIVE
LEUKOCYTE ESTERASE UR QL STRIP: NEGATIVE
LYMPHOCYTES # BLD AUTO: 2.6 K/UL (ref 1–4.8)
LYMPHOCYTES NFR BLD: 27.9 % (ref 18–48)
MCH RBC QN AUTO: 30.5 PG (ref 27–31)
MCHC RBC AUTO-ENTMCNC: 34.2 G/DL (ref 32–36)
MCV RBC AUTO: 89 FL (ref 82–98)
MICROSCOPIC COMMENT: ABNORMAL
MONOCYTES # BLD AUTO: 0.8 K/UL (ref 0.3–1)
MONOCYTES NFR BLD: 8.5 % (ref 4–15)
NEUTROPHILS # BLD AUTO: 5.6 K/UL (ref 1.8–7.7)
NEUTROPHILS NFR BLD: 59.9 % (ref 38–73)
NITRITE UR QL STRIP: NEGATIVE
NRBC BLD-RTO: 0 /100 WBC
PH UR STRIP: 5 [PH] (ref 5–8)
PLATELET # BLD AUTO: 190 K/UL (ref 150–450)
PMV BLD AUTO: 8.6 FL (ref 9.2–12.9)
POTASSIUM SERPL-SCNC: 3.5 MMOL/L (ref 3.5–5.1)
PROT SERPL-MCNC: 6.9 G/DL (ref 6–8.4)
PROT UR QL STRIP: NEGATIVE
RBC # BLD AUTO: 4.4 M/UL (ref 4.6–6.2)
RBC #/AREA URNS HPF: 53 /HPF (ref 0–4)
SODIUM SERPL-SCNC: 143 MMOL/L (ref 136–145)
SP GR UR STRIP: 1.01 (ref 1–1.03)
SQUAMOUS #/AREA URNS HPF: 0 /HPF
URN SPEC COLLECT METH UR: ABNORMAL
UROBILINOGEN UR STRIP-ACNC: NEGATIVE EU/DL
WBC # BLD AUTO: 9.33 K/UL (ref 3.9–12.7)
WBC #/AREA URNS HPF: 0 /HPF (ref 0–5)
YEAST URNS QL MICRO: ABNORMAL

## 2024-02-03 PROCEDURE — 85025 COMPLETE CBC W/AUTO DIFF WBC: CPT | Performed by: EMERGENCY MEDICINE

## 2024-02-03 PROCEDURE — 81000 URINALYSIS NONAUTO W/SCOPE: CPT

## 2024-02-03 PROCEDURE — 80053 COMPREHEN METABOLIC PANEL: CPT | Performed by: EMERGENCY MEDICINE

## 2024-02-03 PROCEDURE — 96374 THER/PROPH/DIAG INJ IV PUSH: CPT

## 2024-02-03 PROCEDURE — 96375 TX/PRO/DX INJ NEW DRUG ADDON: CPT

## 2024-02-03 PROCEDURE — 96361 HYDRATE IV INFUSION ADD-ON: CPT

## 2024-02-03 PROCEDURE — 63600175 PHARM REV CODE 636 W HCPCS: Performed by: EMERGENCY MEDICINE

## 2024-02-03 PROCEDURE — 99285 EMERGENCY DEPT VISIT HI MDM: CPT | Mod: 25

## 2024-02-03 PROCEDURE — 25000003 PHARM REV CODE 250

## 2024-02-03 PROCEDURE — 25000003 PHARM REV CODE 250: Performed by: EMERGENCY MEDICINE

## 2024-02-03 PROCEDURE — 63600175 PHARM REV CODE 636 W HCPCS

## 2024-02-03 RX ORDER — MORPHINE SULFATE 4 MG/ML
4 INJECTION, SOLUTION INTRAMUSCULAR; INTRAVENOUS
Status: COMPLETED | OUTPATIENT
Start: 2024-02-03 | End: 2024-02-03

## 2024-02-03 RX ORDER — KETOROLAC TROMETHAMINE 10 MG/1
10 TABLET, FILM COATED ORAL EVERY 6 HOURS
Qty: 20 TABLET | Refills: 0 | Status: SHIPPED | OUTPATIENT
Start: 2024-02-03 | End: 2024-02-08

## 2024-02-03 RX ORDER — TAMSULOSIN HYDROCHLORIDE 0.4 MG/1
0.4 CAPSULE ORAL DAILY
Qty: 10 CAPSULE | Refills: 0 | Status: SHIPPED | OUTPATIENT
Start: 2024-02-03

## 2024-02-03 RX ORDER — ONDANSETRON HYDROCHLORIDE 2 MG/ML
4 INJECTION, SOLUTION INTRAVENOUS
Status: COMPLETED | OUTPATIENT
Start: 2024-02-03 | End: 2024-02-03

## 2024-02-03 RX ORDER — OXYCODONE AND ACETAMINOPHEN 7.5; 325 MG/1; MG/1
1 TABLET ORAL EVERY 6 HOURS PRN
Qty: 12 TABLET | Refills: 0 | Status: SHIPPED | OUTPATIENT
Start: 2024-02-03

## 2024-02-03 RX ORDER — ONDANSETRON 4 MG/1
4 TABLET, ORALLY DISINTEGRATING ORAL EVERY 6 HOURS PRN
Qty: 15 TABLET | Refills: 0 | Status: SHIPPED | OUTPATIENT
Start: 2024-02-03

## 2024-02-03 RX ORDER — KETOROLAC TROMETHAMINE 30 MG/ML
15 INJECTION, SOLUTION INTRAMUSCULAR; INTRAVENOUS
Status: COMPLETED | OUTPATIENT
Start: 2024-02-03 | End: 2024-02-03

## 2024-02-03 RX ORDER — TAMSULOSIN HYDROCHLORIDE 0.4 MG/1
0.4 CAPSULE ORAL
Status: COMPLETED | OUTPATIENT
Start: 2024-02-03 | End: 2024-02-03

## 2024-02-03 RX ADMIN — ONDANSETRON 4 MG: 2 INJECTION INTRAMUSCULAR; INTRAVENOUS at 02:02

## 2024-02-03 RX ADMIN — TAMSULOSIN HYDROCHLORIDE 0.4 MG: 0.4 CAPSULE ORAL at 03:02

## 2024-02-03 RX ADMIN — KETOROLAC TROMETHAMINE 15 MG: 30 INJECTION, SOLUTION INTRAMUSCULAR; INTRAVENOUS at 02:02

## 2024-02-03 RX ADMIN — SODIUM CHLORIDE 1000 ML: 9 INJECTION, SOLUTION INTRAVENOUS at 01:02

## 2024-02-03 RX ADMIN — MORPHINE SULFATE 4 MG: 4 INJECTION INTRAVENOUS at 01:02

## 2024-02-03 NOTE — ED PROVIDER NOTES
Encounter Date: 2/3/2024       History     Chief Complaint   Patient presents with    Flank Pain     (R) flank pain sine this morning. Similar to previous kidney stone pain. Pt. Appears moderately distressed.      Patient is a 58-year-old male with a history of kidney stones who presents with a sudden onset of right lower back and flank pain earlier today.  No nausea or vomiting.  He denies fever.  He noticed his urine looked dark earlier today and took Pyridium.      Review of patient's allergies indicates:  No Known Allergies  Past Medical History:   Diagnosis Date    Community acquired pneumonia 8/12/2012    Kidney calculi     Kidney stones     Past Surgical History:   Procedure Laterality Date    APPENDECTOMY  childhood    CHOLECYSTECTOMY  2005     Family History   Problem Relation Age of Onset    Coronary artery disease Father 62        CABG    Diabetes type II Mother     Diabetes type II Sister     Cirrhosis Sister     Transient ischemic attack Sister      Social History     Tobacco Use    Smoking status: Every Day     Current packs/day: 1.50     Average packs/day: 1.5 packs/day for 31.0 years (46.5 ttl pk-yrs)     Types: Cigarettes   Substance Use Topics    Alcohol use: No     Review of Systems   Constitutional:  Negative for chills and fever.   Cardiovascular:  Negative for chest pain.   Genitourinary:  Positive for flank pain.   Musculoskeletal:  Positive for back pain.       Physical Exam     Initial Vitals [02/03/24 1334]   BP Pulse Resp Temp SpO2   (!) 184/104 (!) 114 (!) 24 98 °F (36.7 °C) 97 %      MAP       --         Physical Exam    Nursing note and vitals reviewed.  Constitutional: No distress.   Cardiovascular:  Normal rate, regular rhythm and normal heart sounds.           Pulmonary/Chest: No respiratory distress.   Abdominal: Abdomen is soft. There is no abdominal tenderness.   Musculoskeletal:         General: Normal range of motion.      Comments: No CVA tenderness.     Neurological: He is  alert.   Skin: Skin is warm and dry.         ED Course   Procedures  Labs Reviewed   URINALYSIS, REFLEX TO URINE CULTURE - Abnormal; Notable for the following components:       Result Value    Color, UA Orange (*)     Occult Blood UA 2+ (*)     All other components within normal limits    Narrative:     Specimen Source->Urine   URINALYSIS MICROSCOPIC - Abnormal; Notable for the following components:    RBC, UA 53 (*)     All other components within normal limits    Narrative:     Specimen Source->Urine   CBC W/ AUTO DIFFERENTIAL - Abnormal; Notable for the following components:    RBC 4.40 (*)     Hemoglobin 13.4 (*)     Hematocrit 39.2 (*)     MPV 8.6 (*)     Immature Grans (Abs) 0.05 (*)     All other components within normal limits   COMPREHENSIVE METABOLIC PANEL - Abnormal; Notable for the following components:    Alkaline Phosphatase 48 (*)     AST 45 (*)     ALT 48 (*)     All other components within normal limits          Imaging Results               CT Renal Stone Study ABD Pelvis WO (Final result)  Result time 02/03/24 15:22:44      Final result by Peetr Ruiz MD (02/03/24 15:22:44)                   Impression:      4 mm obstructing stone in the right UVJ with associated mild right-sided hydroureteronephrosis.  No additional nephrolithiasis.    Diverticulosis coli without evidence of acute diverticulitis.    Hepatomegaly with hepatic steatosis.    This report was flagged in Epic as abnormal.      Electronically signed by: Peter Ruiz MD  Date:    02/03/2024  Time:    15:22               Narrative:    EXAMINATION:  CT RENAL STONE STUDY ABD PELVIS WO    CLINICAL HISTORY:  Nephrolithiasis, symptomatic/complicated;    TECHNIQUE:  Axial CT scan of the abdomen and pelvis was obtained from the level of the hemidiaphragms to the pubic symphysis without intravenous contrast. Coronal and sagittal reformats were obtained.    COMPARISON:  Retroperitoneal ultrasound dated 01/24/2015.    FINDINGS:  There are no  pleural effusions.  There no evidence of a pneumothorax.  There is no evidence of pneumomediastinum.  No airspace opacity is present.    The heart is unremarkable.  There is normal tapering of the abdominal aorta.  There are calcifications of the abdominal aorta and its branch vessels.    There is no evidence of lymphadenopathy in the abdomen or pelvis.    The esophagus, stomach, and duodenum are within normal limits.  The small bowel loops are unremarkable.  The appendix is not visualized and may be surgically absent.  There is colonic diverticula without evidence of acute diverticulitis.    The liver is enlarged.  There is hepatic steatosis.  The patient is status post cholecystectomy.  The biliary tree is unremarkable.  The spleen is unremarkable.  The pancreas is within normal limits.  The adrenal glands are unremarkable.    No renal parenchymal stones identified.  There is bilateral perinephric stranding with right greater than left.  There is an obstructing 4 mm stone in the right UVJ.  There is mild right-sided hydroureteronephrosis.  The urinary bladder is unremarkable.  There are calcifications in the prostate gland.    There is a displaced cholecystomy clip in the greater omentum.  There is no evidence of free fluid in the abdomen or pelvis.  There is no evidence of free air.  There is no evidence of pneumatosis.  No portal venous air is identified.    The psoas margins are unremarkable.  There is an umbilical hernia containing omental fat.  There are bilateral fat containing inguinal hernias.  There are degenerative changes in the osseous structures.  There is no evidence of a fracture.                                       Medications   sodium chloride 0.9% bolus 1,000 mL 1,000 mL (0 mLs Intravenous Stopped 2/3/24 1446)   morphine injection 4 mg (4 mg Intravenous Given 2/3/24 1351)   ketorolac injection 15 mg (15 mg Intravenous Given 2/3/24 1448)   ondansetron injection 4 mg (4 mg Intravenous Given  2/3/24 1448)   tamsulosin 24 hr capsule 0.4 mg (0.4 mg Oral Given 2/3/24 1551)     Medical Decision Making  DDx :  Including but not limited to :  Urinary tract infection, pyelonephritis, kidney stone, muscle strain.    Emergent evaluation of a 58-year-old male who presents with right flank pain.  Patient has a history of kidney stones.  CT shows a 4 mm stone in the right ureter.  Urinalysis without signs of infection.  Renal function is stable.  I have placed an urgent ambulatory referral to urologist for close follow-up.  He will be sent home with prescriptions for Flomax, Zofran, Toradol and Percocet.  He may return to the ED for any intractable pain, intractable vomiting, fever or any other urgent concerns.    Amount and/or Complexity of Data Reviewed  Labs: ordered.     Details: CBC is unremarkable.  CMP is unremarkable.  Urinalysis 53 white blood cells per high-power field.  Radiology: ordered.     Details: Renal CT shows a 4 mm stone in the right distal ureter.    Risk  Prescription drug management.                                      Clinical Impression:  Final diagnoses:  [N20.0] Kidney stone (Primary)          ED Disposition Condition    Discharge Stable          ED Prescriptions       Medication Sig Dispense Start Date End Date Auth. Provider    ketorolac (TORADOL) 10 mg tablet Take 1 tablet (10 mg total) by mouth every 6 (six) hours. for 5 days 20 tablet 2/3/2024 2/8/2024 Chemo Benton MD    tamsulosin (FLOMAX) 0.4 mg Cap Take 1 capsule (0.4 mg total) by mouth once daily. 10 capsule 2/3/2024 -- Chemo Benton MD    ondansetron (ZOFRAN-ODT) 4 MG TbDL Take 1 tablet (4 mg total) by mouth every 6 (six) hours as needed (Nausea or vomiting). 15 tablet 2/3/2024 -- Chemo Benton MD    oxyCODONE-acetaminophen (PERCOCET) 7.5-325 mg per tablet Take 1 tablet by mouth every 6 (six) hours as needed for Pain. 12 tablet 2/3/2024 -- Chemo Benton MD          Follow-up  Information       Follow up With Specialties Details Why Contact Info    Urologist  Schedule an appointment as soon as possible for a visit       [facility] Emergency Dept Emergency Medicine  If symptoms worsen [address] [facility] [address] [phone]             Chemo Benton MD  02/03/24 2640

## 2024-02-03 NOTE — ED NOTES
58 y.o. male to ED with c.o. right sided flank pain that started early this morning. Patient reports he has a history of kidney stones and that is exactly what it feels like to him. Patient states he has always been able to pass them on his own and has never required surgery. Patient states his last kidney stone was in 2017. Patient denies fever/chills, endorses mild nausea when the pain is at its worse, denies vomiting/ diarrhea, denies dysuria/ hematuria but states he took pyridium which is making his urine orange. Patient denies all other medical complaints at this time. Patient awake, alert, and oriented x 4. No apparent distress noted. VS currently stable. Patient assisted onto stretcher and changed into a gown. Patient placed on continuous pulse oximetry and automatic blood pressure cuff. Bed placed in low locked position, side rails up x 2, call light is within reach of patient orientation to room and explanation of wait provided to patient, alarms set and turned on for monitor and pulse ox, awaiting MD evaluation and orders, plan of care in progress.